# Patient Record
Sex: FEMALE | Race: BLACK OR AFRICAN AMERICAN | Employment: OTHER | URBAN - METROPOLITAN AREA
[De-identification: names, ages, dates, MRNs, and addresses within clinical notes are randomized per-mention and may not be internally consistent; named-entity substitution may affect disease eponyms.]

---

## 2017-07-04 ENCOUNTER — APPOINTMENT (OUTPATIENT)
Dept: GENERAL RADIOLOGY | Age: 67
End: 2017-07-04
Attending: EMERGENCY MEDICINE
Payer: MEDICARE

## 2017-07-04 ENCOUNTER — HOSPITAL ENCOUNTER (EMERGENCY)
Age: 67
Discharge: HOME | End: 2017-07-04
Attending: EMERGENCY MEDICINE
Payer: MEDICARE

## 2017-07-04 VITALS
HEIGHT: 61 IN | RESPIRATION RATE: 18 BRPM | TEMPERATURE: 98.4 F | BODY MASS INDEX: 44.37 KG/M2 | SYSTOLIC BLOOD PRESSURE: 170 MMHG | OXYGEN SATURATION: 97 % | HEART RATE: 77 BPM | DIASTOLIC BLOOD PRESSURE: 79 MMHG | WEIGHT: 235 LBS

## 2017-07-04 DIAGNOSIS — R06.02 SOB (SHORTNESS OF BREATH): ICD-10-CM

## 2017-07-04 DIAGNOSIS — R60.9 FLUID RETENTION: Primary | ICD-10-CM

## 2017-07-04 LAB
ALBUMIN SERPL BCP-MCNC: 3.3 G/DL (ref 3.5–5)
ALBUMIN/GLOB SERPL: 0.9 {RATIO} (ref 1.1–2.2)
ALP SERPL-CCNC: 125 U/L (ref 45–117)
ALT SERPL-CCNC: 28 U/L (ref 12–78)
ANION GAP BLD CALC-SCNC: 7 MMOL/L (ref 5–15)
AST SERPL W P-5'-P-CCNC: 18 U/L (ref 15–37)
BASOPHILS # BLD AUTO: 0 K/UL (ref 0–0.1)
BASOPHILS # BLD: 0 % (ref 0–1)
BILIRUB SERPL-MCNC: 0.3 MG/DL (ref 0.2–1)
BUN SERPL-MCNC: 14 MG/DL (ref 6–20)
BUN/CREAT SERPL: 22 (ref 12–20)
CALCIUM SERPL-MCNC: 8.5 MG/DL (ref 8.5–10.1)
CHLORIDE SERPL-SCNC: 100 MMOL/L (ref 97–108)
CO2 SERPL-SCNC: 31 MMOL/L (ref 21–32)
CREAT SERPL-MCNC: 0.64 MG/DL (ref 0.55–1.02)
EOSINOPHIL # BLD: 0.3 K/UL (ref 0–0.4)
EOSINOPHIL NFR BLD: 5 % (ref 0–7)
ERYTHROCYTE [DISTWIDTH] IN BLOOD BY AUTOMATED COUNT: 13.7 % (ref 11.5–14.5)
GLOBULIN SER CALC-MCNC: 3.7 G/DL (ref 2–4)
GLUCOSE SERPL-MCNC: 85 MG/DL (ref 65–100)
HCT VFR BLD AUTO: 38.2 % (ref 35–47)
HGB BLD-MCNC: 12.5 G/DL (ref 11.5–16)
INR PPP: 1.1 (ref 0.9–1.1)
LYMPHOCYTES # BLD AUTO: 36 % (ref 12–49)
LYMPHOCYTES # BLD: 2.2 K/UL (ref 0.8–3.5)
MAGNESIUM SERPL-MCNC: 2.4 MG/DL (ref 1.6–2.4)
MCH RBC QN AUTO: 27.9 PG (ref 26–34)
MCHC RBC AUTO-ENTMCNC: 32.7 G/DL (ref 30–36.5)
MCV RBC AUTO: 85.3 FL (ref 80–99)
MONOCYTES # BLD: 0.4 K/UL (ref 0–1)
MONOCYTES NFR BLD AUTO: 7 % (ref 5–13)
NEUTS SEG # BLD: 3.2 K/UL (ref 1.8–8)
NEUTS SEG NFR BLD AUTO: 52 % (ref 32–75)
PLATELET # BLD AUTO: 239 K/UL (ref 150–400)
POTASSIUM SERPL-SCNC: 3.7 MMOL/L (ref 3.5–5.1)
PROT SERPL-MCNC: 7 G/DL (ref 6.4–8.2)
PROTHROMBIN TIME: 11 SEC (ref 9–11.1)
RBC # BLD AUTO: 4.48 M/UL (ref 3.8–5.2)
SODIUM SERPL-SCNC: 138 MMOL/L (ref 136–145)
TROPONIN I SERPL-MCNC: <0.04 NG/ML
WBC # BLD AUTO: 6.2 K/UL (ref 3.6–11)

## 2017-07-04 PROCEDURE — 74011250637 HC RX REV CODE- 250/637: Performed by: EMERGENCY MEDICINE

## 2017-07-04 PROCEDURE — 80053 COMPREHEN METABOLIC PANEL: CPT | Performed by: EMERGENCY MEDICINE

## 2017-07-04 PROCEDURE — 84484 ASSAY OF TROPONIN QUANT: CPT | Performed by: EMERGENCY MEDICINE

## 2017-07-04 PROCEDURE — 85610 PROTHROMBIN TIME: CPT | Performed by: EMERGENCY MEDICINE

## 2017-07-04 PROCEDURE — 83735 ASSAY OF MAGNESIUM: CPT | Performed by: EMERGENCY MEDICINE

## 2017-07-04 PROCEDURE — 96374 THER/PROPH/DIAG INJ IV PUSH: CPT

## 2017-07-04 PROCEDURE — 99285 EMERGENCY DEPT VISIT HI MDM: CPT

## 2017-07-04 PROCEDURE — 85025 COMPLETE CBC W/AUTO DIFF WBC: CPT | Performed by: EMERGENCY MEDICINE

## 2017-07-04 PROCEDURE — 71010 XR CHEST PORT: CPT

## 2017-07-04 PROCEDURE — 36415 COLL VENOUS BLD VENIPUNCTURE: CPT | Performed by: EMERGENCY MEDICINE

## 2017-07-04 PROCEDURE — 93005 ELECTROCARDIOGRAM TRACING: CPT

## 2017-07-04 PROCEDURE — 74011250636 HC RX REV CODE- 250/636: Performed by: EMERGENCY MEDICINE

## 2017-07-04 RX ORDER — NAPROXEN 250 MG/1
500 TABLET ORAL
Status: COMPLETED | OUTPATIENT
Start: 2017-07-04 | End: 2017-07-04

## 2017-07-04 RX ORDER — FUROSEMIDE 20 MG/1
20 TABLET ORAL DAILY
Qty: 15 TAB | Refills: 0 | Status: SHIPPED | OUTPATIENT
Start: 2017-07-04

## 2017-07-04 RX ORDER — FUROSEMIDE 10 MG/ML
40 INJECTION INTRAMUSCULAR; INTRAVENOUS
Status: COMPLETED | OUTPATIENT
Start: 2017-07-04 | End: 2017-07-04

## 2017-07-04 RX ORDER — SODIUM CHLORIDE 0.9 % (FLUSH) 0.9 %
5-10 SYRINGE (ML) INJECTION EVERY 8 HOURS
Status: DISCONTINUED | OUTPATIENT
Start: 2017-07-04 | End: 2017-07-04 | Stop reason: HOSPADM

## 2017-07-04 RX ORDER — SODIUM CHLORIDE 0.9 % (FLUSH) 0.9 %
5-10 SYRINGE (ML) INJECTION AS NEEDED
Status: DISCONTINUED | OUTPATIENT
Start: 2017-07-04 | End: 2017-07-04 | Stop reason: HOSPADM

## 2017-07-04 RX ADMIN — NAPROXEN 500 MG: 250 TABLET ORAL at 15:59

## 2017-07-04 RX ADMIN — FUROSEMIDE 40 MG: 10 INJECTION, SOLUTION INTRAMUSCULAR; INTRAVENOUS at 15:30

## 2017-07-04 NOTE — DISCHARGE INSTRUCTIONS
Learning About Fluid Overload  What is fluid overload? Fluid overload means that your body has too much water. The extra fluid in your body can raise your blood pressure and force your heart to work harder. It can also make it hard for you to breathe. Most of your body is made up of water. The body uses minerals like sodium and potassium to help organs such as your heart, kidneys, and liver balance how much water you need. For example, the heart pumps blood to move water around the body. And the kidneys work to get rid of the water that the body doesn't need. Health conditions like kidney disease, heart failure, and cirrhosis can cause fluid overload. Other things can cause extra fluid to build up. IV fluids, some medicines, too much salt (sodium) from food, and certain medical treatments can sometimes cause this fluid increase. What are the symptoms? Some of the most common symptoms are:  · Gaining weight over a short period of time. · Swelling in the ankles or legs. · Shortness of breath. How is it treated? The goal of treatment is to remove the extra fluid in your body. Your treatment will depend on the cause. Your doctor may:  · Give you medicines, such as diuretics (also called \"water pills\"). They help your body get rid of the extra fluid. · Restrict your fluid or salt intake. Follow-up care is a key part of your treatment and safety. Be sure to make and go to all appointments, and call your doctor if you are having problems. It's also a good idea to know your test results and keep a list of the medicines you take. Where can you learn more? Go to http://irene-joey.info/. Enter O110 in the search box to learn more about \"Learning About Fluid Overload. \"  Current as of: January 12, 2017  Content Version: 11.3  © 9289-0074 [employer]. Care instructions adapted under license by [employer] (which disclaims liability or warranty for this information). If you have questions about a medical condition or this instruction, always ask your healthcare professional. [place] [address] any warranty or liability for your use of this information.

## 2017-07-04 NOTE — ED NOTES
Pt discharged by Dr Emili Fuller. Pt provided with discharge instructions Rx and instructions on follow up care. Pt out of ED under own power with steady gait accompanied by family.

## 2017-07-04 NOTE — ED NOTES
Ambulated to bedside commode. SPO2 remained between 96%-98% at all times. Pt complained of rt sided pain starting in the flank area radiating down rt leg. Informed Dr. Neves Favor. Pt back in bed in position of comfort with call bell in reach.

## 2017-07-04 NOTE — ED PROVIDER NOTES
HPI Comments: Tara Ching is a 79 y.o. female with PMHx significant for HTN and arthritis, who presents ambulatory to [facility] ED with cc of progressively worsening swelling in legs and arms for one week. Pt also complains of intermittent chest pain, sharp abdominal pain and distension, SOB on exertion, dizziness and lightheadedness. She states that her swelling was so bad yesterday that she couldn't walk, and that her legs feel \"restless\". She notes that she had just gotten back from traveling, where she had eaten a lot of fast food and been out in the heat, and has not yet seen her PCP. She states to have been eating and drinking ok, and has not been taking any fluid pills or have any hx of liver, kidney, or heart problems. Pt denies any nausea or vomiting. PCP:Robert Meza MD    Social Hx: - smoking; - EtOH; - illicit drug use    There are no other complains, changes, or physical findings at this time. The history is provided by the patient. Past Medical History:   Diagnosis Date    Arthritis     Hypertension     Morbid obesity ([address]        Past Surgical History:   Procedure Laterality Date    HX CHOLECYSTECTOMY      HX HYSTERECTOMY      HX ORTHOPAEDIC  5/4/15    LEFT TOTAL KNEE ARTHROPLASTY, RIGHT KNEE INJECTION     HX TUBAL LIGATION           Family History:   Problem Relation Age of Onset    Cancer Mother      bone cancer    Cancer Maternal Aunt     Cancer Paternal Aunt 79     breast cancer    Cancer Other 46     breast cancer       Social History     Social History    Marital status:      Spouse name: N/A    Number of children: N/A    Years of education: N/A     Occupational History    Not on file.      Social History Main Topics    Smoking status: Former Smoker     Quit date: 4/22/1990    Smokeless tobacco: Never Used    Alcohol use No    Drug use: No    Sexual activity: Not on file     Other Topics Concern    Not on file     Social History Narrative ALLERGIES: Review of patient's allergies indicates no known allergies. Review of Systems   Constitutional: Negative for chills and fever. Respiratory: Positive for shortness of breath. Negative for cough. Cardiovascular: Positive for chest pain and leg swelling. Gastrointestinal: Positive for abdominal distention and abdominal pain. Negative for constipation, diarrhea, nausea and vomiting. Neurological: Positive for dizziness and light-headedness. Negative for weakness and numbness. All other systems reviewed and are negative. Patient Vitals for the past 12 hrs:   Temp Pulse Resp BP SpO2   07/04/17 1530 - 77 - 170/79 -   07/04/17 1515 - 76 18 163/71 97 %   07/04/17 1500 - 76 17 158/84 99 %   07/04/17 1445 - 75 16 143/67 98 %   07/04/17 1415 - 78 15 161/79 99 %   07/04/17 1400 - - - 150/82 94 %   07/04/17 1339 98.4 °F (36.9 °C) 76 14 199/77 95 %       Physical Exam   Constitutional: She is oriented to person, place, and time. She appears well-developed and well-nourished. HENT:   Head: Normocephalic and atraumatic. Eyes: Conjunctivae and EOM are normal.   Neck: Normal range of motion. Neck supple. Cardiovascular: Normal rate and regular rhythm. Pulmonary/Chest: Effort normal and breath sounds normal. No respiratory distress. Abdominal: Soft. She exhibits no distension. There is no tenderness. Musculoskeletal: Normal range of motion. Mild swelling of bilateral arms. 1+ pitting edema in legs. Neurological: She is alert and oriented to person, place, and time. Skin: Skin is warm and dry. Psychiatric: She has a normal mood and affect. Nursing note and vitals reviewed. MDM  Number of Diagnoses or Management Options  Fluid retention:   SOB (shortness of breath):   Diagnosis management comments: Pt presents to ED for fluid retention in arms and legs which is new, likely due to increased salt intake and being out in the heat.  However, will need to rule out heart failure, renal and liver failure; will get labs, CXR, and give her IV Lasix. Amount and/or Complexity of Data Reviewed  Clinical lab tests: reviewed and ordered  Tests in the radiology section of CPT®: ordered and reviewed  Tests in the medicine section of CPT®: ordered and reviewed  Review and summarize past medical records: yes  Independent visualization of images, tracings, or specimens: yes    Patient Progress  Patient progress: stable    ED Course       Procedures    EKG interpretation: (Preliminary)  1:49 PM  Rhythm: normal sinus rhythm; and regular . Rate (approx.): 73 bpm; Axis: normal; CO interval: normal; QRS interval: normal ; ST/T wave: normal; Other findings: normal.  This note is prepared by Pk Grace, acting as Scribe for Gallito Segura M.D.    LABORATORY TESTS:  Recent Results (from the past 12 hour(s))   CBC WITH AUTOMATED DIFF    Collection Time: 07/04/17  2:04 PM   Result Value Ref Range    WBC 6.2 3.6 - 11.0 K/uL    RBC 4.48 3.80 - 5.20 M/uL    HGB 12.5 11.5 - 16.0 g/dL    HCT 38.2 35.0 - 47.0 %    MCV 85.3 80.0 - 99.0 FL    MCH 27.9 26.0 - 34.0 PG    MCHC 32.7 30.0 - 36.5 g/dL    RDW 13.7 11.5 - 14.5 %    PLATELET 884 059 - 673 K/uL    NEUTROPHILS 52 32 - 75 %    LYMPHOCYTES 36 12 - 49 %    MONOCYTES 7 5 - 13 %    EOSINOPHILS 5 0 - 7 %    BASOPHILS 0 0 - 1 %    ABS. NEUTROPHILS 3.2 1.8 - 8.0 K/UL    ABS. LYMPHOCYTES 2.2 0.8 - 3.5 K/UL    ABS. MONOCYTES 0.4 0.0 - 1.0 K/UL    ABS. EOSINOPHILS 0.3 0.0 - 0.4 K/UL    ABS.  BASOPHILS 0.0 0.0 - 0.1 K/UL   METABOLIC PANEL, COMPREHENSIVE    Collection Time: 07/04/17  2:04 PM   Result Value Ref Range    Sodium 138 136 - 145 mmol/L    Potassium 3.7 3.5 - 5.1 mmol/L    Chloride 100 97 - 108 mmol/L    CO2 31 21 - 32 mmol/L    Anion gap 7 5 - 15 mmol/L    Glucose 85 65 - 100 mg/dL    BUN 14 6 - 20 MG/DL    Creatinine 0.64 0.55 - 1.02 MG/DL    BUN/Creatinine ratio 22 (H) 12 - 20      GFR est AA >60 >60 ml/min/1.73m2    GFR est non-AA >60 >60 ml/min/1.73m2    Calcium 8.5 8.5 - 10.1 MG/DL    Bilirubin, total 0.3 0.2 - 1.0 MG/DL    ALT (SGPT) 28 12 - 78 U/L    AST (SGOT) 18 15 - 37 U/L    Alk. phosphatase 125 (H) 45 - 117 U/L    Protein, total 7.0 6.4 - 8.2 g/dL    Albumin 3.3 (L) 3.5 - 5.0 g/dL    Globulin 3.7 2.0 - 4.0 g/dL    A-G Ratio 0.9 (L) 1.1 - 2.2     MAGNESIUM    Collection Time: 07/04/17  2:04 PM   Result Value Ref Range    Magnesium 2.4 1.6 - 2.4 mg/dL   PROTHROMBIN TIME + INR    Collection Time: 07/04/17  2:04 PM   Result Value Ref Range    INR 1.1 0.9 - 1.1      Prothrombin time 11.0 9.0 - 11.1 sec   TROPONIN I    Collection Time: 07/04/17  2:04 PM   Result Value Ref Range    Troponin-I, Qt. <0.04 <0.05 ng/mL       IMAGING RESULTS:  CXR Results  (Last 48 hours)               07/04/17 1410  XR CHEST PORT Final result    Impression:  Impression:   Unchanged mild cardiomegaly. Narrative:  Chest portable AP       History: Chest pain. Pitting edema. Dizziness. Comparison: 6/30/2015       Findings: The heart is mildly enlarged, but unchanged. No significant edema. No   focal consolidation, pleural effusion, or pneumothorax. Degenerative changes are   seen in both shoulders. MEDICATIONS GIVEN:  Medications   sodium chloride (NS) flush 5-10 mL (not administered)   sodium chloride (NS) flush 5-10 mL (not administered)   furosemide (LASIX) injection 40 mg (not administered)       IMPRESSION:  1. Fluid retention    2. SOB (shortness of breath)        PLAN:  1. Current Discharge Medication List      START taking these medications    Details   furosemide (LASIX) 20 mg tablet Take 1 Tab by mouth daily. Qty: 15 Tab, Refills: 0         CONTINUE these medications which have NOT CHANGED    Details   HYDROcodone-acetaminophen (NORCO) 5-325 mg per tablet Take 1 Tab by mouth every four (4) hours as needed for Pain (DO NOT TAKE WITH OTHER TYLENOL CONTAINING PRODUCTS). Max Daily Amount: 6 Tabs.   Qty: 12 Tab, Refills: 0      traMADol (ULTRAM) 50 mg tablet Take 1 Tab by mouth every six (6) hours as needed for Pain. Max Daily Amount: 200 mg. Qty: 80 Tab, Refills: 0      acetaminophen (TYLENOL) 500 mg tablet Take 2 Tabs by mouth every six (6) hours as needed for Pain. Qty: 1 Tab, Refills: 0      omeprazole (PRILOSEC) 40 mg capsule Take 40 mg by mouth daily. gabapentin (NEURONTIN) 100 mg capsule Take  by mouth three (3) times daily. hydrochlorothiazide (HYDRODIURIL) 25 mg tablet Take 25 mg by mouth daily. simvastatin (ZOCOR) 20 mg tablet Take  by mouth nightly. STOP taking these medications       oxyCODONE-acetaminophen (PERCOCET) 5-325 mg per tablet Comments:   Reason for Stoppin.   Follow-up Information     Follow up With Details Comments Contact Info    Denny Delacruz MD In 1 day  [address]  [phone]  Renea Irving [address], DO  [facility] [address]  [address]  [phone]          Return to ED if worse     DISCHARGE NOTE  2:55 PM  The patient has been re-evaluated and is ready for discharge. Reviewed available results with patient. Counseled patient on diagnosis and care plan. Patient has expressed understanding, and all questions have been answered. Patient agrees with plan and agrees to follow up as recommended, or return to the ED if their symptoms worsen. Discharge instructions have been provided and explained to the patient, along with reasons to return to the ED. ATTESTATION:  This note is prepared by Zack Burton, acting as Scribe for Rhiannon Tatum M.D. Rhiannon Tatum M.D: The scribe's documentation has been prepared under my direction and personally reviewed by me in its entirety. I confirm that the note above accurately reflects all work, treatment, procedures, and medical decision making performed by me.

## 2017-07-05 LAB
ATRIAL RATE: 73 BPM
CALCULATED P AXIS, ECG09: 59 DEGREES
CALCULATED R AXIS, ECG10: 40 DEGREES
CALCULATED T AXIS, ECG11: 34 DEGREES
DIAGNOSIS, 93000: NORMAL
P-R INTERVAL, ECG05: 156 MS
Q-T INTERVAL, ECG07: 394 MS
QRS DURATION, ECG06: 78 MS
QTC CALCULATION (BEZET), ECG08: 434 MS
VENTRICULAR RATE, ECG03: 73 BPM

## 2018-02-05 NOTE — ED NOTES
3+ pitting edema BLE.  bilat pedal and bilat post tibial pulses non palpable. Strong doppler signals to bilat pedal and bilat post tib pulses. Problem: Patient Care Overview  Goal: Plan of Care Review  Plan of care reviewed with the patient and daughter. Verbalized clear understanding. Bed alarm set. Bed in lowest position. Pt remain afebrile and free of fall. Call light within reach. Instructed pt to call when getting out of bed. Given nitro and IV morphine push for chest pain. Converted to sinus from A-fib after metoprolol given. Daughter at the bedside. Will continue to monitor.

## 2018-06-01 ENCOUNTER — APPOINTMENT (OUTPATIENT)
Dept: GENERAL RADIOLOGY | Age: 68
End: 2018-06-01
Attending: PHYSICIAN ASSISTANT
Payer: MEDICARE

## 2018-06-01 ENCOUNTER — HOSPITAL ENCOUNTER (EMERGENCY)
Age: 68
Discharge: HOME | End: 2018-06-01
Attending: EMERGENCY MEDICINE
Payer: MEDICARE

## 2018-06-01 VITALS
HEIGHT: 61 IN | OXYGEN SATURATION: 99 % | WEIGHT: 233 LBS | RESPIRATION RATE: 16 BRPM | TEMPERATURE: 98.4 F | BODY MASS INDEX: 43.99 KG/M2 | DIASTOLIC BLOOD PRESSURE: 60 MMHG | SYSTOLIC BLOOD PRESSURE: 144 MMHG | HEART RATE: 76 BPM

## 2018-06-01 DIAGNOSIS — M17.0 PRIMARY OSTEOARTHRITIS OF BOTH KNEES: Primary | ICD-10-CM

## 2018-06-01 DIAGNOSIS — M51.36 DDD (DEGENERATIVE DISC DISEASE), LUMBAR: ICD-10-CM
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
## 2018-06-01 DIAGNOSIS — M19.012 OSTEOARTHRITIS OF LEFT GLENOHUMERAL JOINT: ICD-10-CM

## 2018-06-01 PROCEDURE — 73030 X-RAY EXAM OF SHOULDER: CPT

## 2018-06-01 PROCEDURE — 72100 X-RAY EXAM L-S SPINE 2/3 VWS: CPT

## 2018-06-01 PROCEDURE — 73562 X-RAY EXAM OF KNEE 3: CPT

## 2018-06-01 PROCEDURE — 99282 EMERGENCY DEPT VISIT SF MDM: CPT

## 2018-06-01 RX ORDER — ACETAMINOPHEN 325 MG/1
650 TABLET ORAL
Qty: 20 TAB | Refills: 0 | Status: SHIPPED | OUTPATIENT
Start: 2018-06-01

## 2018-06-01 RX ORDER — TIMOLOL MALEATE 5 MG/ML
1 SOLUTION/ DROPS OPHTHALMIC 2 TIMES DAILY
COMMUNITY

## 2018-06-01 RX ORDER — MELOXICAM 7.5 MG/1
7.5 TABLET ORAL DAILY
COMMUNITY

## 2018-06-01 RX ORDER — LATANOPROST 50 UG/ML
1 SOLUTION/ DROPS OPHTHALMIC
COMMUNITY

## 2018-06-01 RX ORDER — LISINOPRIL AND HYDROCHLOROTHIAZIDE 12.5; 2 MG/1; MG/1
1 TABLET ORAL DAILY
COMMUNITY

## 2018-06-01 NOTE — DISCHARGE INSTRUCTIONS
Preventing Falls: Care Instructions  Your Care Instructions    Getting around your home safely can be a challenge if you have injuries or health problems that make it easy for you to fall. Loose rugs and furniture in walkways are among the dangers for many older people who have problems walking or who have poor eyesight. People who have conditions such as arthritis, osteoporosis, or dementia also have to be careful not to fall. You can make your home safer with a few simple measures. Follow-up care is a key part of your treatment and safety. Be sure to make and go to all appointments, and call your doctor if you are having problems. It's also a good idea to know your test results and keep a list of the medicines you take. How can you care for yourself at home? Taking care of yourself  · You may get dizzy if you do not drink enough water. To prevent dehydration, drink plenty of fluids, enough so that your urine is light yellow or clear like water. Choose water and other caffeine-free clear liquids. If you have kidney, heart, or liver disease and have to limit fluids, talk with your doctor before you increase the amount of fluids you drink. · Exercise regularly to improve your strength, muscle tone, and balance. Walk if you can. Swimming may be a good choice if you cannot walk easily. · Have your vision and hearing checked each year or any time you notice a change. If you have trouble seeing and hearing, you might not be able to avoid objects and could lose your balance. · Know the side effects of the medicines you take. Ask your doctor or pharmacist whether the medicines you take can affect your balance. Sleeping pills or sedatives can affect your balance. · Limit the amount of alcohol you drink. Alcohol can impair your balance and other senses. · Ask your doctor whether calluses or corns on your feet need to be removed.  If you wear loose-fitting shoes because of calluses or corns, you can lose your balance and fall. · Talk to your doctor if you have numbness in your feet. Preventing falls at home  · Remove raised doorway thresholds, throw rugs, and clutter. Repair loose carpet or raised areas in the floor. · Move furniture and electrical cords to keep them out of walking paths. · Use nonskid floor wax, and wipe up spills right away, especially on ceramic tile floors. · If you use a walker or cane, put rubber tips on it. If you use crutches, clean the bottoms of them regularly with an abrasive pad, such as steel wool. · Keep your house well lit, especially [place], and outside walkways. Use night-lights in areas such as hallways and bathrooms. Add extra light switches or use remote switches (such as switches that go on or off when you clap your hands) to make it easier to turn lights on if you have to get up during the night. · Install sturdy handrails on stairways. · Move items in your cabinets so that the things you use a lot are on the lower shelves (about waist level). · Keep a cordless phone and a flashlight with new batteries by your bed. If possible, put a phone in each of the main rooms of your house, or carry a cell phone in case you fall and cannot reach a phone. Or, you can wear a device around your neck or wrist. You push a button that sends a signal for help. · Wear low-heeled shoes that fit well and give your feet good support. Use footwear with nonskid soles. Check the heels and soles of your shoes for wear. Repair or replace worn heels or soles. · Do not wear socks without shoes on wood floors. · Walk on the grass when the sidewalks are slippery. If you live in an area that gets snow and ice in the winter, sprinkle salt on slippery steps and sidewalks. Preventing falls in the bath  · Install grab bars and nonskid mats inside and outside your shower or tub and near the toilet and sinks. · Use shower chairs and bath benches.   · Use a hand-held shower head that will allow you to sit while showering. · Get into a tub or shower by putting the weaker leg in first. Get out of a tub or shower with your strong side first.  · Repair loose toilet seats and consider installing a raised toilet seat to make getting on and off the toilet easier. · Keep your bathroom door unlocked while you are in the shower. Where can you learn more? Go to http://irene-joey.info/. Enter [id number] in the search box to learn more about \"Preventing Falls: Care Instructions. \"  Current as of: May 12, 2017  Content Version: 11.4  © 4436-0541 [employer]. Care instructions adapted under license by [employer] (which disclaims liability or warranty for this information). If you have questions about a medical condition or this instruction, always ask your healthcare professional. [place] [address] any warranty or liability for your use of this information. Arthritis: Care Instructions  Your Care Instructions  Arthritis, also called osteoarthritis, is a breakdown of the cartilage that cushions your joints. When the cartilage wears down, your bones rub against each other. This causes pain and stiffness. Many people have some arthritis as they age. Arthritis most often affects the joints of the spine, hands, hips, knees, or feet. You can take simple measures to protect your joints, ease your pain, and help you stay active. Follow-up care is a key part of your treatment and safety. Be sure to make and go to all appointments, and call your doctor if you are having problems. It's also a good idea to know your test results and keep a list of the medicines you take. How can you care for yourself at home? · Stay at a healthy weight. Being overweight puts extra strain on your joints. · Talk to your doctor or physical therapist about exercises that will help ease joint pain. ¨ Stretch.  You may enjoy gentle forms of yoga to help keep your joints and muscles flexible. ¨ Walk instead of jog. Other types of exercise that are less stressful on the joints include riding a bicycle, swimming, tani chi, or water exercise. ¨ Lift weights. Strong muscles help reduce stress on your joints. Stronger thigh muscles, for example, take some of the stress off of the knees and hips. Learn the right way to lift weights so you do not make joint pain worse. · Take your medicines exactly as prescribed. Call your doctor if you think you are having a problem with your medicine. · Take pain medicines exactly as directed. ¨ If the doctor gave you a prescription medicine for pain, take it as prescribed. ¨ If you are not taking a prescription pain medicine, ask your doctor if you can take an over-the-counter medicine. · Use a cane, crutch, walker, or another device if you need help to get around. These can help rest your joints. You also can use other things to make life easier, such as a higher toilet seat and padded handles on kitchen utensils. · Do not sit in low chairs, which can make it hard to get up. · Put heat or cold on your sore joints as needed. Use whichever helps you most. You also can take turns with hot and cold packs. ¨ Apply heat 2 or 3 times a day for 20 to 30 minutes-using a heating pad, hot shower, or hot pack-to relieve pain and stiffness. ¨ Put ice or a cold pack on your sore joint for 10 to 20 minutes at a time. Put a thin cloth between the ice and your skin. When should you call for help? Call your doctor now or seek immediate medical care if:  ? · You have sudden swelling, warmth, or pain in any joint. ? · You have joint pain and a fever or rash. ? · You have such bad pain that you cannot use a joint. ? Watch closely for changes in your health, and be sure to contact your doctor if:  ? · You have mild joint symptoms that continue even with more than 6 weeks of care at home. ? · You have stomach pain or other problems with your medicine.    Where can you learn more? Go to http://irene-joey.info/. Enter B158 in the search box to learn more about \"Arthritis: Care Instructions. \"  Current as of: October 31, 2016  Content Version: 11.4  © 5407-4450 [employer]. Care instructions adapted under license by [employer] (which disclaims liability or warranty for this information). If you have questions about a medical condition or this instruction, always ask your healthcare professional. [address] any warranty or liability for your use of this information. Learning About Degenerative Disc Disease  What is degenerative disc disease? Degenerative disc disease is not really a disease. It's a term used to describe the normal changes in your spinal discs as you age. Spinal discs are small, spongy discs that separate the bones (vertebrae) that make up the spine. The discs act as shock absorbers for the spine, so it can flex, bend, and twist.  Degenerative disc disease can take place in one or more places along the spine. It most often occurs in the discs in the lower back and the neck. What causes it? As we age, our spinal discs break down, or degenerate. This breakdown causes the symptoms of degenerative disc disease in some people. When the discs break down, they can lose fluid and dry out, and their outer layers can have tiny cracks or tears. This leads to less padding and less space between the bones in the spine. The body reacts to this by making bony growths on the spine called bone spurs. These spurs can press on the spinal nerve roots or spinal cord. This can cause pain and can affect how well the nerves work. What are the symptoms? Many people with degenerative disc disease have no pain. But others have severe pain or other symptoms that limit their activities. Some of the most common symptoms are:  · Pain in the back or neck.  Where the pain occurs depends on which discs are affected. · Pain that gets worse when you move, such as bending over, reaching up, or twisting. · Pain that may occur in the rear end (buttocks), arm, or leg if a nerve is pinched. · Numbness or tingling in your arm or leg. How is it diagnosed? A doctor can often diagnose degenerative disc disease while doing a physical exam. If your exam shows no signs of a serious condition, imaging tests (such as an X-ray) aren't likely to help your doctor find the cause of your symptoms. Sometimes degenerative disc disease is found when an X-ray is taken for another reason, such as an injury or other health problem. But even if the doctor finds degenerative disc disease, that doesn't always mean that you will have symptoms. How is it treated? There are several things you can do at home to manage pain from this problem. · To relieve pain, use ice or heat (whichever feels better) on the affected area. ¨ Put ice or a cold pack on the area for 10 to 20 minutes at a time. Put a thin cloth between the ice and your skin. ¨ Put a warm water bottle, a heating pad set on low, or a warm cloth on your back. Put a thin cloth between the heating pad and your skin. Do not go to sleep with a heating pad on your skin. · Ask your doctor if you can take acetaminophen (such as Tylenol) or nonsteroidal anti-inflammatory drugs, such as ibuprofen or naproxen. Your doctor can prescribe stronger medicines if needed. Be safe with medicines. Read and follow all instructions on the label. · Get some exercise every day. Exercise is one of the best ways to help your back feel better and stay better. It's best to start each exercise slowly. You may notice a little soreness, and that's okay. But if an exercise makes your pain worse, stop doing it. Here are things you can try:  ¨ Walking. It's the simplest and maybe the best activity for your back. It gets your blood moving and helps your muscles stay strong.   ¨ Exercises that gently stretch and strengthen your stomach, back, and leg muscles. The stronger those muscles are, the better they're able to protect your back. If you have constant or severe pain in your back or spine, you may need other treatments, such as physical therapy. In some cases, your doctor may suggest surgery. Follow-up care is a key part of your treatment and safety. Be sure to make and go to all appointments, and call your doctor if you are having problems. It's also a good idea to know your test results and keep a list of the medicines you take. Where can you learn more? Go to http://irene-joey.info/. Enter Q409 in the search box to learn more about \"Learning About Degenerative Disc Disease. \"  Current as of: March 21, 2017  Content Version: 11.4  © 4507-3014 [employer]. Care instructions adapted under license by [employer] (which disclaims liability or warranty for this information). If you have questions about a medical condition or this instruction, always ask your healthcare professional. [address] any warranty or liability for your use of this information. Herniated Disc: Care Instructions  Your Care Instructions    The bones that form the spine in your back are cushioned by small discs. If a disc is damaged, it may bulge or break open (herniate). A herniated disc can result from normal wear and tear as we age or from an injury or disease. If a herniated disc presses on a nerve, it can cause pain and numbness in your leg (sciatica) and/or back pain. You may be able to heal your herniated disc with a few weeks or months of rest, medicine, and exercises. In some cases, you may need surgery. Follow-up care is a key part of your treatment and safety. Be sure to make and go to all appointments, and call your doctor if you are having problems. It's also a good idea to know your test results and keep a list of the medicines you take.   How can you care for yourself at home? · Take your medicines exactly as prescribed. Call your doctor if you think you are having a problem with your medicine. · Ask your doctor if you can take an over-the-counter pain medicine, such as acetaminophen (Tylenol), ibuprofen (Advil, Motrin), or naproxen (Aleve). Read and follow all instructions on the label. · Do not take two or more pain medicines at the same time unless the doctor told you to. Many pain medicines have acetaminophen, which is Tylenol. Too much acetaminophen (Tylenol) can be harmful. · Rest your back if your pain is severe. · Avoid movements and positions that increase your pain or numbness. · Try taking short walks and doing light activities that do not cause pain. Even if you are feeling some pain, it is important to keep your muscles active and strong. · Use heat or ice to relieve pain. ¨ To apply heat, put a warm water bottle, heating pad set on low, or warm cloth on your back. Do not go to sleep with a heating pad on your skin. ¨ To use ice, put ice or a cold pack on the area for 10 to 20 minutes at a time. Put a thin cloth between the ice and your skin. · Your doctor may recommend a physical therapy program, where you learn exercises to do at home. These exercises strengthen the muscles that support your lower back and prevent reinjury. · Stay at a healthy weight. This may reduce the load on your back. · Quit smoking if you smoke. If you need help quitting, talk to your doctor about stop-smoking programs and medicines. These can increase your chances of quitting for good. · To avoid hurting your back when lifting:  ¨ Lift with your legs, not your back, by squatting and bending your knees. Avoid bending forward at the waist when lifting. ¨ Rise slowly. ¨ Keep the load as close to your body as possible, at the level of your navel. ¨ Avoid turning or twisting your body while holding a heavy object. ¨ Get help if you need to lift a heavy object.  Never lift a heavy object above shoulder level. When should you call for help? Call 911 anytime you think you may need emergency care. For example, call if:  ? · You are unable to move a leg at all. ?Call your doctor now or seek immediate medical care if:  ? · You have new or worse symptoms in your arms, legs, chest, belly, or buttocks. Symptoms may include:  ¨ Numbness or tingling. ¨ Weakness. ¨ Pain. ? · You lose bladder or bowel control. ? Watch closely for changes in your health, and be sure to contact your doctor if:  ? · You are not getting better as expected. Where can you learn more? Go to http://ireneCrowd Visionjoey.info/. Enter F534 in the search box to learn more about \"Herniated Disc: Care Instructions. \"  Current as of: March 21, 2017  Content Version: 11.4  © 3301-4645 [employer]. Care instructions adapted under license by [employer] (which disclaims liability or warranty for this information). If you have questions about a medical condition or this instruction, always ask your healthcare professional. [place] [address] any warranty or liability for your use of this information. Learning About Degenerative Disc Disease  What is degenerative disc disease? Degenerative disc disease is not really a disease. It's a term used to describe the normal changes in your spinal discs as you age. Spinal discs are small, spongy discs that separate the bones (vertebrae) that make up the spine. The discs act as shock absorbers for the spine, so it can flex, bend, and twist.  Degenerative disc disease can take place in one or more places along the spine. It most often occurs in the discs in the lower back and the neck. What causes it? As we age, our spinal discs break down, or degenerate. This breakdown causes the symptoms of degenerative disc disease in some people.   When the discs break down, they can lose fluid and dry out, and their outer layers can have tiny cracks or tears. This leads to less padding and less space between the bones in the spine. The body reacts to this by making bony growths on the spine called bone spurs. These spurs can press on the spinal nerve roots or spinal cord. This can cause pain and can affect how well the nerves work. What are the symptoms? Many people with degenerative disc disease have no pain. But others have severe pain or other symptoms that limit their activities. Some of the most common symptoms are:  · Pain in the back or neck. Where the pain occurs depends on which discs are affected. · Pain that gets worse when you move, such as bending over, reaching up, or twisting. · Pain that may occur in the rear end (buttocks), arm, or leg if a nerve is pinched. · Numbness or tingling in your arm or leg. How is it diagnosed? A doctor can often diagnose degenerative disc disease while doing a physical exam. If your exam shows no signs of a serious condition, imaging tests (such as an X-ray) aren't likely to help your doctor find the cause of your symptoms. Sometimes degenerative disc disease is found when an X-ray is taken for another reason, such as an injury or other health problem. But even if the doctor finds degenerative disc disease, that doesn't always mean that you will have symptoms. How is it treated? There are several things you can do at home to manage pain from this problem. · To relieve pain, use ice or heat (whichever feels better) on the affected area. ¨ Put ice or a cold pack on the area for 10 to 20 minutes at a time. Put a thin cloth between the ice and your skin. ¨ Put a warm water bottle, a heating pad set on low, or a warm cloth on your back. Put a thin cloth between the heating pad and your skin. Do not go to sleep with a heating pad on your skin. · Ask your doctor if you can take acetaminophen (such as Tylenol) or nonsteroidal anti-inflammatory drugs, such as ibuprofen or naproxen. Your doctor can prescribe stronger medicines if needed. Be safe with medicines. Read and follow all instructions on the label. · Get some exercise every day. Exercise is one of the best ways to help your back feel better and stay better. It's best to start each exercise slowly. You may notice a little soreness, and that's okay. But if an exercise makes your pain worse, stop doing it. Here are things you can try:  ¨ Walking. It's the simplest and maybe the best activity for your back. It gets your blood moving and helps your muscles stay strong. ¨ Exercises that gently stretch and strengthen your stomach, back, and leg muscles. The stronger those muscles are, the better they're able to protect your back. If you have constant or severe pain in your back or spine, you may need other treatments, such as physical therapy. In some cases, your doctor may suggest surgery. Follow-up care is a key part of your treatment and safety. Be sure to make and go to all appointments, and call your doctor if you are having problems. It's also a good idea to know your test results and keep a list of the medicines you take. Where can you learn more? Go to http://irene-joey.info/. Enter U176 in the search box to learn more about \"Learning About Degenerative Disc Disease. \"  Current as of: March 21, 2017  Content Version: 11.5  © 0051-3451 [employer]. Care instructions adapted under license by [employer] (which disclaims liability or warranty for this information). If you have questions about a medical condition or this instruction, always ask your healthcare professional. [place] [address] any warranty or liability for your use of this information.

## 2018-06-01 NOTE — ED PROVIDER NOTES
EMERGENCY DEPARTMENT HISTORY AND PHYSICAL EXAM      Date: 6/1/2018  Patient Name: Bre Webber    History of Presenting Illness     Chief Complaint   Patient presents with   Markel Stewart in the bathub yesterday and landed on her left knee. She had previous surgery on that knee. History Provided By: Patient    HPI: Bre Webber, 76 y.o. female with PMHx significant for HTN and arthritis, presents ambulatory to the ED with cc of gradually worsening pain to the bilateral knees, the left shoulder, and bilateral lower back since yesterday. Per pt, she was getting in the bathtub yesterday when she lost her balance and sustained a mechanical fall inside, falling onto her bilateral knees and hitting her left lateral shoulder on the tub. Since the incident, pt informs she has been presenting with increased discomfort to the bilateral knees with a moderate to high intensity alongside an associated sore, aching, dull sensation to the region. Pt informs the discomfort to her knees is increased with movements, weight bearing, and ambulation with the assistance of a cane. Pt informs the discomfort to the left lateral shoulder additionally presents with a moderate intensity alongside an associated sharp, aching sensation to the region. Pt informs her discomfort to the left lateral shoulder is increased with rotation and movements of the left upper extremity. Pt lastly reports bilateral back pain diffusely with a moderate intensity alongside an associates sore, throbbing sensation. Pt informs the back pain is increased with movements and palpation. Pt states she likely did not hit her head during the incident, and did not lose consciousness. Pt reports no OTC medications or other modifying factors for her discomfort.  Pt specifically denies any fevers, chills, nausea, vomiting, headaches, dizziness, abdominal pain, diarrhea, urinary complications, urinary or bowel incontinence, saddle paresthesia, chest pain, or SOB.    PSHx: cholecystectomy, tubal ligation, hysterectomy   Social Hx: - EtOH; Former Smoker; - Illicit Drugs    PCP: Hector Mensah MD    There are no other complaints, changes, or physical findings at this time. Current Outpatient Prescriptions   Medication Sig Dispense Refill    lisinopril-hydroCHLOROthiazide (PRINZIDE, ZESTORETIC) 20-12.5 mg per tablet Take 1 Tab by mouth daily.  meloxicam (MOBIC) 7.5 mg tablet Take 7.5 mg by mouth daily.  latanoprost (XALATAN) 0.005 % ophthalmic solution Administer 1 Drop to both eyes nightly.  timolol (TIMOPTIC) 0.5 % ophthalmic solution 1 Drop two (2) times a day.  acetaminophen (TYLENOL) 325 mg tablet Take 2 Tabs by mouth every four (4) hours as needed for Pain. 20 Tab 0    omeprazole (PRILOSEC) 40 mg capsule Take 40 mg by mouth daily.  gabapentin (NEURONTIN) 100 mg capsule Take 300 mg by mouth three (3) times daily.  simvastatin (ZOCOR) 20 mg tablet Take  by mouth nightly.  furosemide (LASIX) 20 mg tablet Take 1 Tab by mouth daily. (Patient taking differently: Take 40 mg by mouth daily. ) 15 Tab 0     Past History     Past Medical History:  Past Medical History:   Diagnosis Date    Arthritis     Hypertension     Morbid obesity ([address]      Past Surgical History:  Past Surgical History:   Procedure Laterality Date    HX CHOLECYSTECTOMY      HX HYSTERECTOMY      HX ORTHOPAEDIC  5/4/15    LEFT TOTAL KNEE ARTHROPLASTY, RIGHT KNEE INJECTION     HX TUBAL LIGATION       Family History:  Family History   Problem Relation Age of Onset    Cancer Mother      bone cancer    Cancer Other 46     breast cancer    Cancer Maternal Aunt     Cancer Paternal Aunt 79     breast cancer     Social History:  Social History   Substance Use Topics    Smoking status: Former Smoker     Quit date: 4/22/1990    Smokeless tobacco: Never Used    Alcohol use No     Allergies:  No Known Allergies  Review of Systems   Review of Systems Constitutional: Negative. Negative for activity change, appetite change, chills, diaphoresis, fever and unexpected weight change. HENT: Negative for congestion, hearing loss, rhinorrhea, sinus pressure, sneezing, sore throat and trouble swallowing. Eyes: Negative for visual disturbance. Respiratory: Negative for cough, shortness of breath and wheezing. Cardiovascular: Negative for chest pain, palpitations and leg swelling. Gastrointestinal: Negative for abdominal pain, constipation, diarrhea, nausea and vomiting. Genitourinary: Negative for dysuria. Musculoskeletal: Positive for arthralgias (BL knees, L shoulder) and myalgias (BL knees, L shoulder). Negative for gait problem. Skin: Negative for color change, pallor, rash and wound. Neurological: Negative for tremors, weakness, light-headedness, numbness and headaches. All other systems reviewed and are negative. Physical Exam   Physical Exam   Constitutional: She is oriented to person, place, and time. Vital signs are normal. She appears well-developed and well-nourished. No distress. 76 y.o. female in NAD  Communicates appropriately and in full sentences  Normal vital signs    Morbidly obese   HENT:   Head: Normocephalic and atraumatic. Eyes: Conjunctivae are normal. Pupils are equal, round, and reactive to light. Right eye exhibits no discharge. Left eye exhibits no discharge. Neck: Normal range of motion. Neck supple. No nuchal rigidity   Cardiovascular: Normal rate, regular rhythm and intact distal pulses. Pulmonary/Chest: Effort normal and breath sounds normal. No respiratory distress. She has no wheezes. Abdominal: Soft. Bowel sounds are normal. She exhibits no distension. There is no tenderness. Musculoskeletal: She exhibits no deformity. No neurologic, motor, vascular, or compartment embarrassment observed on exam. No focal neurologic deficits. BACK: Normal spinal curvatures. No step off or deformity.  midline low spine tenderness into bilateral muscles; Negative seated SLR bilaterally. Flexion/extension movement's at pt's baseline. Ambulatory without difficulty. Diffuse TTP to BL knees without locality; well healed L total knee scar; ambulatory without assistance; diffuse L shoulder tenderness   Neurological: She is alert and oriented to person, place, and time. Coordination normal. GCS eye subscore is 4. GCS verbal subscore is 5. GCS motor subscore is 6. Skin: Skin is warm and dry. No rash noted. She is not diaphoretic. No erythema. No pallor. Psychiatric: She has a normal mood and affect. Nursing note and vitals reviewed. Diagnostic Study Results     Radiologic Studies -   XR SPINE LUMB 2 OR 3 V   Final Result      XR KNEE RT 3 V   Final Result      XR KNEE LT 3 V   Final Result      XR SHOULDER LT AP/LAT MIN 2 V   Final Result         LUMBAR SPINE, 3 VIEWS. 6/1/2018 2:01 PM     INDICATION: fall     COMPARISON: None.     FINDINGS:  Frontal, lateral, and lumbosacral radiographs. Lumbar alignment and vertebral  body heights are normal. There is diffuse degenerative disease, worse at L1-L2. Facet osteoarthritis is mild L2-L4 and moderate L4-S1. Cholecystectomy clips in  the right upper quadrant.     IMPRESSION  IMPRESSION:  Moderate degenerative disease.        EXAM:  XR SHOULDER LT AP/LAT MIN 2 V     INDICATION:  Left shoulder pain, fall.     COMPARISON: None.     FINDINGS: Three views of the left shoulder demonstrate no fracture, dislocation  or other acute abnormality. There is glenohumeral osteoarthritis. There is  rotator cuff space calcification and narrowing.     IMPRESSION  IMPRESSION:  No acute abnormality. Degenerative findings.     EXAM:  XR KNEE RT 3 V     INDICATION:   pain, fall.     COMPARISON: None.     FINDINGS: Three views of the right knee demonstrate severe medial and  patellofemoral compartment osteoarthritis, with more moderate lateral  compartment osteoarthritis and proximal tibiofibular osteoarthritis. No  fracture, dislocation, soft tissue swelling, or joint effusion.     IMPRESSION  IMPRESSION:  No acute abnormality. Severe osteoarthritis. EXAM:  XR KNEE LT 3 V     INDICATION:   pain, fall yesterday.     COMPARISON: None.     FINDINGS: Three views of the left knee demonstrate total knee arthroplasty. There is placed fracture, dislocation, soft tissue swelling, or joint effusion.     IMPRESSION  IMPRESSION:  No acute abnormality. Medical Decision Making   I am the first provider for this patient. I reviewed the vital signs, available nursing notes, past medical history, past surgical history, family history and social history. Vital Signs-Reviewed the patient's vital signs. Patient Vitals for the past 12 hrs:   Temp Pulse Resp BP SpO2   06/01/18 1303 98.4 °F (36.9 °C) 76 16 144/60 99 %     Records Reviewed: Nursing Notes and Old Medical Records    Provider Notes (Medical Decision Making):   Patient presents after fall with BL knee, L shoulder pain, and bilateral back. Will get imaging to further evaluate for fracture vs. Dislocation vs. Contusion. Will treat with analgesics at this time and continue to monitor for changes in mentation. I have discussed with the patient how to reduce likelihood of falling at home by removing throw rugs, loose wires or other objects from floor, installing hand-rails in bathrooms, tubs and halls, and leaving some lights on at night. ED Course:   Initial assessment performed. The patients presenting problems have been discussed, and they are in agreement with the care plan formulated and outlined with them. I have encouraged them to ask questions as they arise throughout their visit. Disposition:  DISCHARGE NOTE:    2:28 PM  The patient is ready for discharge. The patient signs, symptoms, diagnosis, and discharge instructions have been discussed and the patient has conveyed their understanding.  The patient is to follow-up as reccommended or returned to the ER should their symptoms worsen. Plan has been discussed and patient is in agreement. PLAN:  1. Current Discharge Medication List      CONTINUE these medications which have CHANGED    Details   acetaminophen (TYLENOL) 325 mg tablet Take 2 Tabs by mouth every four (4) hours as needed for Pain. Qty: 20 Tab, Refills: 0           2. Follow-up Information     Follow up With Details Comments Contact Info    Leellen Cheadle, MD Schedule an appointment as soon as possible for a visit in 2 days As needed, If symptoms worsen, Possible further evaluation and treatment [address]  [address] [address] [phone]      \[facility] [place] [facility]\"" EMERGENCY DEPT Go to As needed, If symptoms worsen [address] [facility]  [place] [address] Mohan Cruz MD Call today  [address]  [address]  [address] [address] [phone]          Return to ED if worse     Diagnosis     Clinical Impression:   1. Primary osteoarthritis of both knees    2. DDD (degenerative disc disease), lumbar    3. Osteoarthritis of left glenohumeral joint    4. Fall in bathtub, initial encounter      Attestations: This note is prepared by Karen Coates, acting as Scribe for Flora Toro PA-C. Flora Toro PA-C: The scribe's documentation has been prepared under my direction and personally reviewed by me in its entirety. I confirm that the note above accurately reflects all work, treatment, procedures, and medical decision making performed by me. This note will not be viewable in [address]

## 2018-09-01 ENCOUNTER — APPOINTMENT (OUTPATIENT)
Dept: GENERAL RADIOLOGY | Age: 68
End: 2018-09-01
Attending: EMERGENCY MEDICINE
Payer: MEDICARE

## 2018-09-01 ENCOUNTER — HOSPITAL ENCOUNTER (EMERGENCY)
Age: 68
Discharge: HOME | End: 2018-09-01
Attending: EMERGENCY MEDICINE | Admitting: EMERGENCY MEDICINE
Payer: MEDICARE

## 2018-09-01 ENCOUNTER — APPOINTMENT (OUTPATIENT)
Dept: CT IMAGING | Age: 68
End: 2018-09-01
Attending: EMERGENCY MEDICINE
Payer: MEDICARE

## 2018-09-01 VITALS
SYSTOLIC BLOOD PRESSURE: 179 MMHG | BODY MASS INDEX: 37.99 KG/M2 | HEIGHT: 65 IN | OXYGEN SATURATION: 93 % | DIASTOLIC BLOOD PRESSURE: 78 MMHG | TEMPERATURE: 99.2 F | HEART RATE: 101 BPM | WEIGHT: 228 LBS | RESPIRATION RATE: 21 BRPM

## 2018-09-01 DIAGNOSIS — K57.92 DIVERTICULITIS: Primary | ICD-10-CM

## 2018-09-01 LAB
ALBUMIN SERPL-MCNC: 3.3 G/DL (ref 3.5–5)
ALBUMIN/GLOB SERPL: 0.8 {RATIO} (ref 1.1–2.2)
ALP SERPL-CCNC: 104 U/L (ref 45–117)
ALT SERPL-CCNC: 23 U/L (ref 12–78)
ANION GAP SERPL CALC-SCNC: 10 MMOL/L (ref 5–15)
APPEARANCE UR: CLEAR
AST SERPL-CCNC: 25 U/L (ref 15–37)
BACTERIA URNS QL MICRO: NEGATIVE /HPF
BASOPHILS # BLD: 0 K/UL (ref 0–0.1)
BASOPHILS NFR BLD: 0 % (ref 0–1)
BILIRUB SERPL-MCNC: 0.3 MG/DL (ref 0.2–1)
BILIRUB UR QL: NEGATIVE
BUN SERPL-MCNC: 7 MG/DL (ref 6–20)
BUN/CREAT SERPL: 9 (ref 12–20)
CALCIUM SERPL-MCNC: 9.2 MG/DL (ref 8.5–10.1)
CHLORIDE SERPL-SCNC: 101 MMOL/L (ref 97–108)
CO2 SERPL-SCNC: 27 MMOL/L (ref 21–32)
COLOR UR: ABNORMAL
CREAT SERPL-MCNC: 0.78 MG/DL (ref 0.55–1.02)
DIFFERENTIAL METHOD BLD: ABNORMAL
EOSINOPHIL # BLD: 0 K/UL (ref 0–0.4)
EOSINOPHIL NFR BLD: 0 % (ref 0–7)
EPITH CASTS URNS QL MICRO: ABNORMAL /LPF
ERYTHROCYTE [DISTWIDTH] IN BLOOD BY AUTOMATED COUNT: 13.8 % (ref 11.5–14.5)
GLOBULIN SER CALC-MCNC: 4.3 G/DL (ref 2–4)
GLUCOSE SERPL-MCNC: 104 MG/DL (ref 65–100)
GLUCOSE UR STRIP.AUTO-MCNC: NEGATIVE MG/DL
HCT VFR BLD AUTO: 42.4 % (ref 35–47)
HGB BLD-MCNC: 13.9 G/DL (ref 11.5–16)
HGB UR QL STRIP: ABNORMAL
HYALINE CASTS URNS QL MICRO: ABNORMAL /LPF (ref 0–5)
IMM GRANULOCYTES # BLD: 0 K/UL (ref 0–0.04)
IMM GRANULOCYTES NFR BLD AUTO: 0 % (ref 0–0.5)
KETONES UR QL STRIP.AUTO: NEGATIVE MG/DL
LACTATE SERPL-SCNC: 1 MMOL/L (ref 0.4–2)
LEUKOCYTE ESTERASE UR QL STRIP.AUTO: NEGATIVE
LYMPHOCYTES # BLD: 1.1 K/UL (ref 0.8–3.5)
LYMPHOCYTES NFR BLD: 11 % (ref 12–49)
MCH RBC QN AUTO: 28.1 PG (ref 26–34)
MCHC RBC AUTO-ENTMCNC: 32.8 G/DL (ref 30–36.5)
MCV RBC AUTO: 85.8 FL (ref 80–99)
MONOCYTES # BLD: 0.7 K/UL (ref 0–1)
MONOCYTES NFR BLD: 7 % (ref 5–13)
NEUTS SEG # BLD: 8.3 K/UL (ref 1.8–8)
NEUTS SEG NFR BLD: 81 % (ref 32–75)
NITRITE UR QL STRIP.AUTO: NEGATIVE
NRBC # BLD: 0 K/UL (ref 0–0.01)
NRBC BLD-RTO: 0 PER 100 WBC
PH UR STRIP: 6 [PH] (ref 5–8)
PLATELET # BLD AUTO: 195 K/UL (ref 150–400)
PMV BLD AUTO: 10.2 FL (ref 8.9–12.9)
POTASSIUM SERPL-SCNC: 3 MMOL/L (ref 3.5–5.1)
PROT SERPL-MCNC: 7.6 G/DL (ref 6.4–8.2)
PROT UR STRIP-MCNC: NEGATIVE MG/DL
RBC # BLD AUTO: 4.94 M/UL (ref 3.8–5.2)
RBC #/AREA URNS HPF: ABNORMAL /HPF (ref 0–5)
SODIUM SERPL-SCNC: 138 MMOL/L (ref 136–145)
SP GR UR REFRACTOMETRY: 1.03 (ref 1–1.03)
UA: UC IF INDICATED,UAUC: ABNORMAL
UROBILINOGEN UR QL STRIP.AUTO: 0.2 EU/DL (ref 0.2–1)
WBC # BLD AUTO: 10.2 K/UL (ref 3.6–11)
WBC URNS QL MICRO: ABNORMAL /HPF (ref 0–4)

## 2018-09-01 PROCEDURE — 81001 URINALYSIS AUTO W/SCOPE: CPT | Performed by: EMERGENCY MEDICINE

## 2018-09-01 PROCEDURE — 36415 COLL VENOUS BLD VENIPUNCTURE: CPT | Performed by: EMERGENCY MEDICINE

## 2018-09-01 PROCEDURE — 87040 BLOOD CULTURE FOR BACTERIA: CPT | Performed by: EMERGENCY MEDICINE

## 2018-09-01 PROCEDURE — 83605 ASSAY OF LACTIC ACID: CPT | Performed by: EMERGENCY MEDICINE

## 2018-09-01 PROCEDURE — 74011250636 HC RX REV CODE- 250/636: Performed by: EMERGENCY MEDICINE

## 2018-09-01 PROCEDURE — 96361 HYDRATE IV INFUSION ADD-ON: CPT

## 2018-09-01 PROCEDURE — 85025 COMPLETE CBC W/AUTO DIFF WBC: CPT | Performed by: EMERGENCY MEDICINE

## 2018-09-01 PROCEDURE — 74177 CT ABD & PELVIS W/CONTRAST: CPT

## 2018-09-01 PROCEDURE — 74011250637 HC RX REV CODE- 250/637: Performed by: EMERGENCY MEDICINE

## 2018-09-01 PROCEDURE — 99285 EMERGENCY DEPT VISIT HI MDM: CPT

## 2018-09-01 PROCEDURE — 96360 HYDRATION IV INFUSION INIT: CPT

## 2018-09-01 PROCEDURE — 71045 X-RAY EXAM CHEST 1 VIEW: CPT

## 2018-09-01 PROCEDURE — 80053 COMPREHEN METABOLIC PANEL: CPT | Performed by: EMERGENCY MEDICINE

## 2018-09-01 PROCEDURE — 74011636320 HC RX REV CODE- 636/320: Performed by: EMERGENCY MEDICINE

## 2018-09-01 RX ORDER — AMOXICILLIN AND CLAVULANATE POTASSIUM 875; 125 MG/1; MG/1
1 TABLET, FILM COATED ORAL
Status: COMPLETED | OUTPATIENT
Start: 2018-09-01 | End: 2018-09-01

## 2018-09-01 RX ORDER — OXYCODONE HYDROCHLORIDE 5 MG/1
5 TABLET ORAL
Status: COMPLETED | OUTPATIENT
Start: 2018-09-01 | End: 2018-09-01

## 2018-09-01 RX ORDER — ONDANSETRON 4 MG/1
8 TABLET, ORALLY DISINTEGRATING ORAL
Status: COMPLETED | OUTPATIENT
Start: 2018-09-01 | End: 2018-09-01

## 2018-09-01 RX ORDER — LOPERAMIDE HYDROCHLORIDE 2 MG/1
2 CAPSULE ORAL
Status: COMPLETED | OUTPATIENT
Start: 2018-09-01 | End: 2018-09-01

## 2018-09-01 RX ORDER — SODIUM CHLORIDE 0.9 % (FLUSH) 0.9 %
10 SYRINGE (ML) INJECTION
Status: COMPLETED | OUTPATIENT
Start: 2018-09-01 | End: 2018-09-01

## 2018-09-01 RX ORDER — AMOXICILLIN AND CLAVULANATE POTASSIUM 875; 125 MG/1; MG/1
1 TABLET, FILM COATED ORAL 2 TIMES DAILY
Qty: 14 TAB | Refills: 0 | Status: SHIPPED | OUTPATIENT
Start: 2018-09-01 | End: 2018-09-08

## 2018-09-01 RX ORDER — SODIUM CHLORIDE 9 MG/ML
1000 INJECTION, SOLUTION INTRAVENOUS ONCE
Status: COMPLETED | OUTPATIENT
Start: 2018-09-01 | End: 2018-09-01

## 2018-09-01 RX ORDER — SODIUM CHLORIDE 9 MG/ML
50 INJECTION, SOLUTION INTRAVENOUS
Status: COMPLETED | OUTPATIENT
Start: 2018-09-01 | End: 2018-09-01

## 2018-09-01 RX ORDER — ACETAMINOPHEN 325 MG/1
650 TABLET ORAL
Status: COMPLETED | OUTPATIENT
Start: 2018-09-01 | End: 2018-09-01

## 2018-09-01 RX ORDER — OXYCODONE HYDROCHLORIDE 5 MG/1
5 TABLET ORAL
Qty: 12 TAB | Refills: 0 | Status: SHIPPED | OUTPATIENT
Start: 2018-09-01

## 2018-09-01 RX ADMIN — SODIUM CHLORIDE 50 ML/HR: 900 INJECTION, SOLUTION INTRAVENOUS at 19:00

## 2018-09-01 RX ADMIN — OXYCODONE HYDROCHLORIDE 5 MG: 5 TABLET ORAL at 20:34

## 2018-09-01 RX ADMIN — LOPERAMIDE HYDROCHLORIDE 2 MG: 2 CAPSULE ORAL at 17:57

## 2018-09-01 RX ADMIN — ONDANSETRON 8 MG: 4 TABLET, ORALLY DISINTEGRATING ORAL at 17:57

## 2018-09-01 RX ADMIN — AMOXICILLIN AND CLAVULANATE POTASSIUM 1 TABLET: 875; 125 TABLET, FILM COATED ORAL at 20:34

## 2018-09-01 RX ADMIN — SODIUM CHLORIDE 1000 ML: 900 INJECTION, SOLUTION INTRAVENOUS at 18:20

## 2018-09-01 RX ADMIN — SODIUM CHLORIDE 500 ML: 900 INJECTION, SOLUTION INTRAVENOUS at 17:40

## 2018-09-01 RX ADMIN — IOPAMIDOL 100 ML: 755 INJECTION, SOLUTION INTRAVENOUS at 19:00

## 2018-09-01 RX ADMIN — ACETAMINOPHEN 650 MG: 325 TABLET ORAL at 17:39

## 2018-09-01 RX ADMIN — Medication 10 ML: at 19:00

## 2018-09-01 NOTE — ED PROVIDER NOTES
EMERGENCY DEPARTMENT HISTORY AND PHYSICAL EXAM 
 
 
Date: 9/1/2018 Patient Name: Richard Palmer History of Presenting Illness Chief Complaint Patient presents with  Diarrhea  
  diarrhea headache chills started thursday night. History Provided By: Patient HPI: Richard Palmer, 76 y.o. female with PMHx significant for HTN, arthritis and morbid obesity, presents ambulatory to the ED with cc of gradual onset, constant, worsening N/V/D x 2 days with associated chills, a fever, a mild, generalized HA, abd pain and a non-productive cough. Abd pain is described as a mild, generalized cramping sensation that is unchanged by any position or activity. Pt notes that her symptoms began about 2 days ago and persistence prompted the trip to the ED for evaluation. She has had 10-12 BMs in the past 2 days with 5 occurring today. Of note, she states that she may have eaten some undercooked chicken recently. She reports no recent abx courses. She further states no recent international travel or recent hospital admissions. Pt specifically denies any CP or SOB. There are no other complaints, changes, or physical findings at this time. PCP: Dayo Carter MD 
 
Current Facility-Administered Medications Medication Dose Route Frequency Provider Last Rate Last Dose  oxyCODONE IR (ROXICODONE) tablet 5 mg  5 mg Oral NOW Vita Riggins, DO      
 amoxicillin-clavulanate (AUGMENTIN) 875-125 mg per tablet 1 Tab  1 Tab Oral NOW Vita Riggins DO      
 
Current Outpatient Prescriptions Medication Sig Dispense Refill  oxyCODONE IR (ROXICODONE) 5 mg immediate release tablet Take 1 Tab by mouth every four (4) hours as needed for Pain. Max Daily Amount: 30 mg. 12 Tab 0  
 amoxicillin-clavulanate (AUGMENTIN) 875-125 mg per tablet Take 1 Tab by mouth two (2) times a day for 7 days. 14 Tab 0  
 lisinopril-hydroCHLOROthiazide (PRINZIDE, ZESTORETIC) 20-12.5 mg per tablet Take 1 Tab by mouth daily.  meloxicam (MOBIC) 7.5 mg tablet Take 7.5 mg by mouth daily.  latanoprost (XALATAN) 0.005 % ophthalmic solution Administer 1 Drop to both eyes nightly.  timolol (TIMOPTIC) 0.5 % ophthalmic solution 1 Drop two (2) times a day.  acetaminophen (TYLENOL) 325 mg tablet Take 2 Tabs by mouth every four (4) hours as needed for Pain. 20 Tab 0  
 furosemide (LASIX) 20 mg tablet Take 1 Tab by mouth daily. (Patient taking differently: Take 40 mg by mouth daily. ) 15 Tab 0  
 omeprazole (PRILOSEC) 40 mg capsule Take 40 mg by mouth daily.  gabapentin (NEURONTIN) 100 mg capsule Take 300 mg by mouth three (3) times daily.  simvastatin (ZOCOR) 20 mg tablet Take  by mouth nightly. Past History Past Medical History: 
Past Medical History:  
Diagnosis Date  Arthritis  Hypertension  Morbid obesity ([facility] [address] Past Surgical History: 
Past Surgical History:  
Procedure Laterality Date  HX CHOLECYSTECTOMY  HX HYSTERECTOMY  HX ORTHOPAEDIC  5/4/15 LEFT TOTAL KNEE ARTHROPLASTY, RIGHT KNEE INJECTION   
 HX TUBAL LIGATION Family History: 
Family History Problem Relation Age of Onset  Cancer Mother   
  bone cancer  Cancer Other 52  
  breast cancer  Cancer Maternal Aunt  Cancer Paternal Aunt 79  
  breast cancer Social History: 
Social History Substance Use Topics  Smoking status: Former Smoker Quit date: 4/22/1990  Smokeless tobacco: Never Used  Alcohol use No  
 
 
Allergies: 
No Known Allergies Review of Systems Review of Systems Constitutional: Positive for chills and fever (100.9F). HENT: Negative for congestion and sore throat. Eyes: Negative for visual disturbance. Respiratory: Positive for cough. Negative for shortness of breath. Cardiovascular: Negative for chest pain and leg swelling. Gastrointestinal: Positive for abdominal pain, diarrhea, nausea and vomiting. Negative for blood in stool. Endocrine: Negative for polyuria. Genitourinary: Negative for dysuria, flank pain, vaginal bleeding and vaginal discharge. Musculoskeletal: Negative for myalgias. Skin: Negative for rash. Allergic/Immunologic: Negative for immunocompromised state. Neurological: Positive for headaches. Negative for weakness. Psychiatric/Behavioral: Negative for confusion. Physical Exam  
Physical Exam  
Constitutional: She is oriented to person, place, and time. She appears well-developed and well-nourished. Obese HENT:  
Head: Normocephalic and atraumatic. Dry mucous membranes Eyes: Conjunctivae are normal. Pupils are equal, round, and reactive to light. Right eye exhibits no discharge. Left eye exhibits no discharge. Neck: Normal range of motion. Neck supple. No tracheal deviation present. Cardiovascular: Regular rhythm and normal heart sounds. Tachycardia present. No murmur heard. Pulmonary/Chest: Effort normal and breath sounds normal. No respiratory distress. She has no wheezes. She has no rales. Abdominal: Soft. Bowel sounds are normal. There is tenderness. There is no rebound and no guarding. Musculoskeletal: Normal range of motion. She exhibits no edema, tenderness or deformity. Neurological: She is alert and oriented to person, place, and time. Skin: Skin is warm and dry. No rash noted. No erythema. Psychiatric: Her behavior is normal.  
Nursing note and vitals reviewed. Diagnostic Study Results Labs - Recent Results (from the past 12 hour(s)) CBC WITH AUTOMATED DIFF Collection Time: 09/01/18  5:30 PM  
Result Value Ref Range WBC 10.2 3.6 - 11.0 K/uL  
 RBC 4.94 3.80 - 5.20 M/uL  
 HGB 13.9 11.5 - 16.0 g/dL HCT 42.4 35.0 - 47.0 % MCV 85.8 80.0 - 99.0 FL  
 MCH 28.1 26.0 - 34.0 PG  
 MCHC 32.8 30.0 - 36.5 g/dL  
 RDW 13.8 11.5 - 14.5 % PLATELET 189 666 - 452 K/uL MPV 10.2 8.9 - 12.9 FL  
 NRBC 0.0 0  WBC ABSOLUTE NRBC 0.00 0.00 - 0.01 K/uL NEUTROPHILS 81 (H) 32 - 75 % LYMPHOCYTES 11 (L) 12 - 49 % MONOCYTES 7 5 - 13 % EOSINOPHILS 0 0 - 7 % BASOPHILS 0 0 - 1 % IMMATURE GRANULOCYTES 0 0.0 - 0.5 % ABS. NEUTROPHILS 8.3 (H) 1.8 - 8.0 K/UL  
 ABS. LYMPHOCYTES 1.1 0.8 - 3.5 K/UL  
 ABS. MONOCYTES 0.7 0.0 - 1.0 K/UL  
 ABS. EOSINOPHILS 0.0 0.0 - 0.4 K/UL  
 ABS. BASOPHILS 0.0 0.0 - 0.1 K/UL  
 ABS. IMM. GRANS. 0.0 0.00 - 0.04 K/UL  
 DF AUTOMATED METABOLIC PANEL, COMPREHENSIVE Collection Time: 09/01/18  5:30 PM  
Result Value Ref Range Sodium 138 136 - 145 mmol/L Potassium 3.0 (L) 3.5 - 5.1 mmol/L Chloride 101 97 - 108 mmol/L  
 CO2 27 21 - 32 mmol/L Anion gap 10 5 - 15 mmol/L Glucose 104 (H) 65 - 100 mg/dL BUN 7 6 - 20 MG/DL Creatinine 0.78 0.55 - 1.02 MG/DL  
 BUN/Creatinine ratio 9 (L) 12 - 20 GFR est AA >60 >60 ml/min/1.73m2 GFR est non-AA >60 >60 ml/min/1.73m2 Calcium 9.2 8.5 - 10.1 MG/DL Bilirubin, total 0.3 0.2 - 1.0 MG/DL  
 ALT (SGPT) 23 12 - 78 U/L  
 AST (SGOT) 25 15 - 37 U/L Alk. phosphatase 104 45 - 117 U/L Protein, total 7.6 6.4 - 8.2 g/dL Albumin 3.3 (L) 3.5 - 5.0 g/dL Globulin 4.3 (H) 2.0 - 4.0 g/dL A-G Ratio 0.8 (L) 1.1 - 2.2 LACTIC ACID Collection Time: 09/01/18  5:30 PM  
Result Value Ref Range Lactic acid 1.0 0.4 - 2.0 MMOL/L Radiologic Studies -  
CT Results  (Last 48 hours) 09/01/18 1859  CT ABD PELV W CONT Final result Impression:  IMPRESSION:  
   
1. Suspect bowel wall thickening and mild pericolonic inflammation involving the  
distal transverse colon. There are adjacent diverticula. May represent the  
sequela of acute diverticulitis. Colorectal neoplasm could have a similar  
appearance. Colonoscopy is recommended when the patient has recovered from this  
acute event, if the patient is not up to date with this modality. 2. Chronic cardiomegaly and small pericardial effusion. 3. Status post hysterectomy and cholecystectomy. Narrative:  EXAM:  CT ABD PELV W CONT INDICATION: Abdominal pain COMPARISON: CT 6/30/2015 CONTRAST:  100 mL of Isovue-370. TECHNIQUE:   
Following the uneventful intravenous administration of contrast, thin axial  
images were obtained through the abdomen and pelvis. Coronal and sagittal  
reconstructions were generated. Oral contrast was not administered. CT dose  
reduction was achieved through use of a standardized protocol tailored for this  
examination and automatic exposure control for dose modulation. FINDINGS:   
LUNG BASES: Clear. INCIDENTALLY IMAGED HEART AND MEDIASTINUM: There is chronic cardiomegaly and a  
small pericardial effusion. LIVER: No mass or biliary dilatation. GALLBLADDER: Status post cholecystectomy SPLEEN: No mass. PANCREAS: No mass or ductal dilatation. ADRENALS: Unremarkable. KIDNEYS: No mass, calculus, or hydronephrosis. STOMACH: Unremarkable. SMALL BOWEL: No dilatation or wall thickening. COLON: There is extensive diverticulosis of the descending and sigmoid colon,  
with possible mild superimposed acute diverticulitis in the distal transverse  
colon, a medially proximal to the splenic flexure. There is suspected bowel wall  
thickening in this location with mild pericolonic inflammation. Reference axial  
image [id number]. APPENDIX: Unremarkable. PERITONEUM: No ascites or pneumoperitoneum. RETROPERITONEUM: No lymphadenopathy or aortic aneurysm. REPRODUCTIVE ORGANS: Status post hysterectomy. URINARY BLADDER: No mass or calculus. BONES: No destructive bone lesion. ADDITIONAL COMMENTS: N/A  
   
  
  
 
CXR Results  (Last 48 hours) 09/01/18 4255  XR CHEST PORT Final result Impression:  IMPRESSION:  
No acute finding Narrative:  INDICATION: Fever COMPARISON: 7/4/2017 A single frontal view was obtained. The time of this study is 1818 hours. Lungs  
are clear. Heart size normal. Atherosclerotic change of the aorta is noted. John Post Medical Decision Making I am the first provider for this patient. I reviewed the vital signs, available nursing notes, past medical history, past surgical history, family history and social history. Vital Signs-Reviewed the patient's vital signs. Patient Vitals for the past 12 hrs: 
 Temp Pulse Resp BP SpO2  
09/01/18 1937 99.2 °F (37.3 °C) - - - -  
09/01/18 1915 - - - 173/60 94 % 09/01/18 1815 - (!) 104 24 (!) 207/96 96 % 09/01/18 1800 - (!) 106 25 (!) 223/86 98 % 09/01/18 1745 - - - - 97 % 09/01/18 1745 - (!) 108 20 (!) 215/87 97 % 09/01/18 1732 - (!) 110 21 (!) 204/93 95 % 09/01/18 1715 (!) 100.9 °F (38.3 °C) (!) 116 22 197/89 97 % Pulse Oximetry Analysis - 97% on RA Cardiac Monitor:  
Rate: 116 bpm 
Rhythm: Sinus Tachycardia Records Reviewed: Nursing Notes and Old Medical Records Provider Notes (Medical Decision Making): DDx: viral syndrome, colitis, diverticulitis, doubt ischemic colitis, pt has no risk factors at this time for C. difficile. Appears non-toxic. Will obtain labs, CT, provide IV fluids and discharge pending workup. ED Course:  
Initial assessment performed. The patients presenting problems have been discussed, and they are in agreement with the care plan formulated and outlined with them. I have encouraged them to ask questions as they arise throughout their visit. 8:05 PM 
Pt has been re-evaluated and is ready for discharge. Critical Care Time:  
0 Disposition: 
DISCHARGE NOTE: 
8:10 PM 
The patient is ready for discharge. The patients signs, symptoms, diagnosis, and instructions for discharge have been discussed and the pt has conveyed their understanding. The patient is to follow up as recommended or return to the ER should their symptoms worsen.  Plan has been discussed and patient has conveyed their agreement. PLAN: 
1. Discharge to home Current Discharge Medication List  
  
START taking these medications Details  
oxyCODONE IR (ROXICODONE) 5 mg immediate release tablet Take 1 Tab by mouth every four (4) hours as needed for Pain. Max Daily Amount: 30 mg. 
Qty: 12 Tab, Refills: 0 Associated Diagnoses: Diverticulitis  
  
amoxicillin-clavulanate (AUGMENTIN) 875-125 mg per tablet Take 1 Tab by mouth two (2) times a day for 7 days. Qty: 14 Tab, Refills: 0  
  
  
 
2. Follow-up Information Follow up With Details Comments Contact Info Wayne Walsh MD Schedule an appointment as soon as possible for a visit  [address] [facility] 
[address] [address] [phone] [facility] EMERGENCY DEPT  If symptoms worsen [address] [place] [address] [address] [facility] 
[phone] Return to ED if worse Diagnosis Clinical Impression: 1. Diverticulitis Attestations: This note is prepared by Savannah Colon, acting as Scribe for [employer], DO. [employer], DO: The scribe's documentation has been prepared under my direction and personally reviewed by me in its entirety. I confirm that the note above accurately reflects all work, treatment, procedures, and medical decision making performed by me.

## 2018-09-01 NOTE — ED NOTES
Verbal bedside report received from [address] [place] [address] (off-going nurse). Patient off floor to CT. Will continue to monitor and assess patient needs.

## 2018-09-01 NOTE — ED NOTES
Assumed care of pt from triage. Pt comes in with c/o nausea and vomiting and diarrhea past couple of days. Pt reports having fever and chills for couple of days. Pt placed on monitor x3, family at bedside, call bell within reach, position of comfort.

## 2018-09-02 NOTE — ED NOTES
_______Rudd_________________ in to talk with patient and explain plan of care with  understanding and  written & verbal instructions.

## 2018-09-02 NOTE — DISCHARGE INSTRUCTIONS
Diverticulitis: Care Instructions  Your Care Instructions    Diverticulitis occurs when pouches form in the wall of the colon and become inflamed or infected. It can be very painful. Doctors aren't sure what causes diverticulitis. There is no proof that foods such as nuts, seeds, or berries cause it or make it worse. A low-fiber diet may cause the colon to work harder to push stool forward. Pouches may form because of this extra work. It may be hard to think about healthy eating while you're in pain. But as you recover, you might think about how you can use healthy eating for overall better health. Healthy eating may help you avoid future attacks. Follow-up care is a key part of your treatment and safety. Be sure to make and go to all appointments, and call your doctor if you are having problems. It's also a good idea to know your test results and keep a list of the medicines you take. How can you care for yourself at home? · Drink plenty of fluids, enough so that your urine is light yellow or clear like water. If you have kidney, heart, or liver disease and have to limit fluids, talk with your doctor before you increase the amount of fluids you drink. · Stick to liquids or a bland diet (plain rice, bananas, dry toast or crackers, applesauce) until you are feeling better. Then you can return to regular foods and gradually increase the amount of fiber in your diet. · Use a heating pad set on low on your belly to relieve mild cramps and pain. · Get extra rest until you are feeling better. · Be safe with medicines. Read and follow all instructions on the label. ¨ If the doctor gave you a prescription medicine for pain, take it as prescribed. ¨ If you are not taking a prescription pain medicine, ask your doctor if you can take an over-the-counter medicine. · If your doctor prescribed antibiotics, take them as directed. Do not stop taking them just because you feel better.  You need to take the full course of antibiotics. To prevent future attacks of diverticulitis  · Avoid constipation:  ¨ Include fruits, vegetables, beans, and whole grains in your diet each day. These foods are high in fiber. ¨ Drink plenty of fluids, enough so that your urine is light yellow or clear like water. If you have kidney, heart, or liver disease and have to limit fluids, talk with your doctor before you increase the amount of fluids you drink. ¨ Get some exercise every day. Build up slowly to 30 to 60 minutes a day on 5 or more days of the week. ¨ Take a fiber supplement, such as Citrucel or Metamucil, every day if needed. Read and follow all instructions on the label. ¨ Schedule time each day for a bowel movement. Having a daily routine may help. Take your time and do not strain when having a bowel movement. When should you call for help? Call your doctor now or seek immediate medical care if:    · You have a fever.     · You are vomiting.     · You have new or worse belly pain.     · You cannot pass stools or gas.    Watch closely for changes in your health, and be sure to contact your doctor if you have any problems. Where can you learn more? Go to http://irene-joey.info/. Enter H901 in the search box to learn more about \"Diverticulitis: Care Instructions. \"  Current as of: May 12, 2017  Content Version: 11.7  © 2865-3567 [employer]. Care instructions adapted under license by [employer] (which disclaims liability or warranty for this information). If you have questions about a medical condition or this instruction, always ask your healthcare professional. [place] [address] any warranty or liability for your use of this information.

## 2018-09-02 NOTE — ED NOTES
Patient complained of continued nausea but stated that she did not feel like vomiting. Patient declined wanting any more medication at this time. Patient family at bedside. Will continue to monitor and assess patient needs.

## 2018-09-06 LAB
BACTERIA SPEC CULT: NORMAL
SERVICE CMNT-IMP: NORMAL